# Patient Record
Sex: MALE | Race: WHITE | Employment: FULL TIME | URBAN - METROPOLITAN AREA
[De-identification: names, ages, dates, MRNs, and addresses within clinical notes are randomized per-mention and may not be internally consistent; named-entity substitution may affect disease eponyms.]

---

## 2022-03-20 ENCOUNTER — APPOINTMENT (OUTPATIENT)
Dept: CT IMAGING | Age: 27
DRG: 101 | End: 2022-03-20
Payer: COMMERCIAL

## 2022-03-20 ENCOUNTER — HOSPITAL ENCOUNTER (INPATIENT)
Age: 27
LOS: 2 days | Discharge: HOME | DRG: 101 | End: 2022-03-22
Attending: EMERGENCY MEDICINE | Admitting: INTERNAL MEDICINE
Payer: COMMERCIAL

## 2022-03-20 ENCOUNTER — APPOINTMENT (OUTPATIENT)
Dept: GENERAL RADIOLOGY | Age: 27
DRG: 101 | End: 2022-03-20
Payer: COMMERCIAL

## 2022-03-20 DIAGNOSIS — R79.89 ELEVATED LFTS: ICD-10-CM

## 2022-03-20 DIAGNOSIS — R74.8 ELEVATED LIPASE: ICD-10-CM

## 2022-03-20 DIAGNOSIS — R56.9 SEIZURE (HCC): Primary | ICD-10-CM

## 2022-03-20 PROBLEM — F41.9 ANXIETY: Status: ACTIVE | Noted: 2022-03-20

## 2022-03-20 PROBLEM — E66.3 OVERWEIGHT (BMI 25.0-29.9): Status: ACTIVE | Noted: 2022-03-20

## 2022-03-20 LAB
ABSOLUTE EOS #: 0 K/UL (ref 0–0.4)
ABSOLUTE LYMPH #: 0.9 K/UL (ref 1–4.8)
ABSOLUTE MONO #: 0.7 K/UL (ref 0.1–1.3)
ACETAMINOPHEN LEVEL: <5 UG/ML (ref 10–30)
ALBUMIN SERPL-MCNC: 5.1 G/DL (ref 3.5–5.2)
ALP BLD-CCNC: 73 U/L (ref 40–129)
ALT SERPL-CCNC: 87 U/L (ref 5–41)
ANION GAP SERPL CALCULATED.3IONS-SCNC: 21 MMOL/L (ref 9–17)
AST SERPL-CCNC: 184 U/L
BASOPHILS # BLD: 0 % (ref 0–2)
BASOPHILS ABSOLUTE: 0 K/UL (ref 0–0.2)
BILIRUB SERPL-MCNC: 1.13 MG/DL (ref 0.3–1.2)
BUN BLDV-MCNC: 14 MG/DL (ref 6–20)
CALCIUM SERPL-MCNC: 10.2 MG/DL (ref 8.6–10.4)
CHLORIDE BLD-SCNC: 94 MMOL/L (ref 98–107)
CO2: 19 MMOL/L (ref 20–31)
CREAT SERPL-MCNC: 0.96 MG/DL (ref 0.7–1.2)
EOSINOPHILS RELATIVE PERCENT: 0 % (ref 0–4)
ETHANOL PERCENT: <0.01 %
ETHANOL: <10 MG/DL
GFR AFRICAN AMERICAN: >60 ML/MIN
GFR NON-AFRICAN AMERICAN: >60 ML/MIN
GFR SERPL CREATININE-BSD FRML MDRD: ABNORMAL ML/MIN/{1.73_M2}
GLUCOSE BLD-MCNC: 186 MG/DL (ref 70–99)
HAV IGM SER IA-ACNC: NONREACTIVE
HCT VFR BLD CALC: 47 % (ref 41–53)
HEMOGLOBIN: 16.3 G/DL (ref 13.5–17.5)
HEPATITIS B CORE IGM ANTIBODY: NONREACTIVE
HEPATITIS B SURFACE ANTIGEN: NONREACTIVE
HEPATITIS C ANTIBODY: NONREACTIVE
LIPASE: 173 U/L (ref 13–60)
LYMPHOCYTES # BLD: 9 % (ref 24–44)
MAGNESIUM: 2.3 MG/DL (ref 1.6–2.6)
MCH RBC QN AUTO: 33 PG (ref 26–34)
MCHC RBC AUTO-ENTMCNC: 34.7 G/DL (ref 31–37)
MCV RBC AUTO: 95 FL (ref 80–100)
MONOCYTES # BLD: 7 % (ref 1–7)
PDW BLD-RTO: 14.8 % (ref 11.5–14.9)
PLATELET # BLD: 174 K/UL (ref 150–450)
PMV BLD AUTO: 8.2 FL (ref 6–12)
POTASSIUM SERPL-SCNC: 3.1 MMOL/L (ref 3.7–5.3)
RBC # BLD: 4.95 M/UL (ref 4.5–5.9)
SALICYLATE LEVEL: <1 MG/DL (ref 3–10)
SARS-COV-2, RAPID: NOT DETECTED
SEG NEUTROPHILS: 84 % (ref 36–66)
SEGMENTED NEUTROPHILS ABSOLUTE COUNT: 8.4 K/UL (ref 1.3–9.1)
SODIUM BLD-SCNC: 134 MMOL/L (ref 135–144)
SPECIMEN DESCRIPTION: NORMAL
TOTAL PROTEIN: 7.6 G/DL (ref 6.4–8.3)
TOXIC TRICYCLIC SC,BLOOD: ABNORMAL
TROPONIN, HIGH SENSITIVITY: 11 NG/L (ref 0–22)
TROPONIN, HIGH SENSITIVITY: 9 NG/L (ref 0–22)
TSH SERPL DL<=0.05 MIU/L-ACNC: 2.02 MIU/L (ref 0.3–5)
WBC # BLD: 10.1 K/UL (ref 3.5–11)

## 2022-03-20 PROCEDURE — 93005 ELECTROCARDIOGRAM TRACING: CPT | Performed by: EMERGENCY MEDICINE

## 2022-03-20 PROCEDURE — 99223 1ST HOSP IP/OBS HIGH 75: CPT | Performed by: INTERNAL MEDICINE

## 2022-03-20 PROCEDURE — 80307 DRUG TEST PRSMV CHEM ANLYZR: CPT

## 2022-03-20 PROCEDURE — 36415 COLL VENOUS BLD VENIPUNCTURE: CPT

## 2022-03-20 PROCEDURE — 80179 DRUG ASSAY SALICYLATE: CPT

## 2022-03-20 PROCEDURE — 2580000003 HC RX 258: Performed by: INTERNAL MEDICINE

## 2022-03-20 PROCEDURE — G0480 DRUG TEST DEF 1-7 CLASSES: HCPCS

## 2022-03-20 PROCEDURE — 70450 CT HEAD/BRAIN W/O DYE: CPT

## 2022-03-20 PROCEDURE — 74177 CT ABD & PELVIS W/CONTRAST: CPT

## 2022-03-20 PROCEDURE — 96367 TX/PROPH/DG ADDL SEQ IV INF: CPT

## 2022-03-20 PROCEDURE — 6370000000 HC RX 637 (ALT 250 FOR IP): Performed by: STUDENT IN AN ORGANIZED HEALTH CARE EDUCATION/TRAINING PROGRAM

## 2022-03-20 PROCEDURE — 87635 SARS-COV-2 COVID-19 AMP PRB: CPT

## 2022-03-20 PROCEDURE — 71045 X-RAY EXAM CHEST 1 VIEW: CPT

## 2022-03-20 PROCEDURE — 96361 HYDRATE IV INFUSION ADD-ON: CPT

## 2022-03-20 PROCEDURE — 1200000000 HC SEMI PRIVATE

## 2022-03-20 PROCEDURE — 99285 EMERGENCY DEPT VISIT HI MDM: CPT

## 2022-03-20 PROCEDURE — 80074 ACUTE HEPATITIS PANEL: CPT

## 2022-03-20 PROCEDURE — 85025 COMPLETE CBC W/AUTO DIFF WBC: CPT

## 2022-03-20 PROCEDURE — 96375 TX/PRO/DX INJ NEW DRUG ADDON: CPT

## 2022-03-20 PROCEDURE — 83690 ASSAY OF LIPASE: CPT

## 2022-03-20 PROCEDURE — 96365 THER/PROPH/DIAG IV INF INIT: CPT

## 2022-03-20 PROCEDURE — 80143 DRUG ASSAY ACETAMINOPHEN: CPT

## 2022-03-20 PROCEDURE — 84443 ASSAY THYROID STIM HORMONE: CPT

## 2022-03-20 PROCEDURE — 80053 COMPREHEN METABOLIC PANEL: CPT

## 2022-03-20 PROCEDURE — 83735 ASSAY OF MAGNESIUM: CPT

## 2022-03-20 PROCEDURE — 6360000004 HC RX CONTRAST MEDICATION: Performed by: EMERGENCY MEDICINE

## 2022-03-20 PROCEDURE — 81001 URINALYSIS AUTO W/SCOPE: CPT

## 2022-03-20 PROCEDURE — 6360000002 HC RX W HCPCS: Performed by: EMERGENCY MEDICINE

## 2022-03-20 PROCEDURE — 6370000000 HC RX 637 (ALT 250 FOR IP): Performed by: INTERNAL MEDICINE

## 2022-03-20 PROCEDURE — 84484 ASSAY OF TROPONIN QUANT: CPT

## 2022-03-20 PROCEDURE — 2580000003 HC RX 258: Performed by: EMERGENCY MEDICINE

## 2022-03-20 RX ORDER — 0.9 % SODIUM CHLORIDE 0.9 %
80 INTRAVENOUS SOLUTION INTRAVENOUS ONCE
Status: DISCONTINUED | OUTPATIENT
Start: 2022-03-20 | End: 2022-03-22 | Stop reason: HOSPADM

## 2022-03-20 RX ORDER — POTASSIUM CHLORIDE 20 MEQ/1
40 TABLET, EXTENDED RELEASE ORAL PRN
Status: DISCONTINUED | OUTPATIENT
Start: 2022-03-20 | End: 2022-03-22 | Stop reason: HOSPADM

## 2022-03-20 RX ORDER — SODIUM CHLORIDE 0.9 % (FLUSH) 0.9 %
5-40 SYRINGE (ML) INJECTION EVERY 12 HOURS SCHEDULED
Status: DISCONTINUED | OUTPATIENT
Start: 2022-03-20 | End: 2022-03-22 | Stop reason: HOSPADM

## 2022-03-20 RX ORDER — POTASSIUM CHLORIDE 7.45 MG/ML
10 INJECTION INTRAVENOUS PRN
Status: DISCONTINUED | OUTPATIENT
Start: 2022-03-20 | End: 2022-03-22 | Stop reason: HOSPADM

## 2022-03-20 RX ORDER — SODIUM CHLORIDE 9 MG/ML
25 INJECTION, SOLUTION INTRAVENOUS PRN
Status: DISCONTINUED | OUTPATIENT
Start: 2022-03-20 | End: 2022-03-22 | Stop reason: HOSPADM

## 2022-03-20 RX ORDER — POTASSIUM CHLORIDE 7.45 MG/ML
10 INJECTION INTRAVENOUS ONCE
Status: COMPLETED | OUTPATIENT
Start: 2022-03-20 | End: 2022-03-20

## 2022-03-20 RX ORDER — ONDANSETRON 4 MG/1
4 TABLET, ORALLY DISINTEGRATING ORAL EVERY 8 HOURS PRN
Status: DISCONTINUED | OUTPATIENT
Start: 2022-03-20 | End: 2022-03-22 | Stop reason: HOSPADM

## 2022-03-20 RX ORDER — ACETAMINOPHEN 650 MG/1
650 SUPPOSITORY RECTAL EVERY 6 HOURS PRN
Status: DISCONTINUED | OUTPATIENT
Start: 2022-03-20 | End: 2022-03-22

## 2022-03-20 RX ORDER — POTASSIUM CHLORIDE 20 MEQ/1
40 TABLET, EXTENDED RELEASE ORAL ONCE
Status: COMPLETED | OUTPATIENT
Start: 2022-03-20 | End: 2022-03-20

## 2022-03-20 RX ORDER — POLYETHYLENE GLYCOL 3350 17 G/17G
17 POWDER, FOR SOLUTION ORAL DAILY PRN
Status: DISCONTINUED | OUTPATIENT
Start: 2022-03-20 | End: 2022-03-22 | Stop reason: HOSPADM

## 2022-03-20 RX ORDER — ONDANSETRON 2 MG/ML
4 INJECTION INTRAMUSCULAR; INTRAVENOUS ONCE
Status: COMPLETED | OUTPATIENT
Start: 2022-03-20 | End: 2022-03-20

## 2022-03-20 RX ORDER — ESCITALOPRAM OXALATE 10 MG/1
10 TABLET ORAL DAILY
COMMUNITY

## 2022-03-20 RX ORDER — SODIUM CHLORIDE 0.9 % (FLUSH) 0.9 %
10 SYRINGE (ML) INJECTION PRN
Status: DISCONTINUED | OUTPATIENT
Start: 2022-03-20 | End: 2022-03-22 | Stop reason: HOSPADM

## 2022-03-20 RX ORDER — 0.9 % SODIUM CHLORIDE 0.9 %
1000 INTRAVENOUS SOLUTION INTRAVENOUS ONCE
Status: COMPLETED | OUTPATIENT
Start: 2022-03-20 | End: 2022-03-20

## 2022-03-20 RX ORDER — ONDANSETRON 2 MG/ML
4 INJECTION INTRAMUSCULAR; INTRAVENOUS EVERY 6 HOURS PRN
Status: DISCONTINUED | OUTPATIENT
Start: 2022-03-20 | End: 2022-03-22 | Stop reason: HOSPADM

## 2022-03-20 RX ORDER — SODIUM CHLORIDE 9 MG/ML
INJECTION, SOLUTION INTRAVENOUS CONTINUOUS
Status: DISCONTINUED | OUTPATIENT
Start: 2022-03-20 | End: 2022-03-22 | Stop reason: HOSPADM

## 2022-03-20 RX ORDER — SODIUM CHLORIDE 0.9 % (FLUSH) 0.9 %
5-40 SYRINGE (ML) INJECTION PRN
Status: DISCONTINUED | OUTPATIENT
Start: 2022-03-20 | End: 2022-03-22 | Stop reason: HOSPADM

## 2022-03-20 RX ORDER — LEVETIRACETAM 500 MG/1
500 TABLET ORAL 2 TIMES DAILY
Status: DISCONTINUED | OUTPATIENT
Start: 2022-03-20 | End: 2022-03-22 | Stop reason: HOSPADM

## 2022-03-20 RX ORDER — ACETAMINOPHEN 325 MG/1
650 TABLET ORAL EVERY 6 HOURS PRN
Status: DISCONTINUED | OUTPATIENT
Start: 2022-03-20 | End: 2022-03-22

## 2022-03-20 RX ADMIN — LEVETIRACETAM 500 MG: 500 TABLET, FILM COATED ORAL at 21:10

## 2022-03-20 RX ADMIN — LEVETIRACETAM 1000 MG: 100 INJECTION INTRAVENOUS at 15:07

## 2022-03-20 RX ADMIN — POTASSIUM CHLORIDE 10 MEQ: 7.46 INJECTION, SOLUTION INTRAVENOUS at 13:59

## 2022-03-20 RX ADMIN — ONDANSETRON 4 MG: 2 INJECTION INTRAMUSCULAR; INTRAVENOUS at 12:52

## 2022-03-20 RX ADMIN — POTASSIUM CHLORIDE 40 MEQ: 20 TABLET, EXTENDED RELEASE ORAL at 21:10

## 2022-03-20 RX ADMIN — SODIUM CHLORIDE 1000 ML: 9 INJECTION, SOLUTION INTRAVENOUS at 12:51

## 2022-03-20 RX ADMIN — SODIUM CHLORIDE, PRESERVATIVE FREE 10 ML: 5 INJECTION INTRAVENOUS at 13:35

## 2022-03-20 RX ADMIN — SODIUM CHLORIDE: 9 INJECTION, SOLUTION INTRAVENOUS at 22:20

## 2022-03-20 RX ADMIN — IOPAMIDOL 75 ML: 755 INJECTION, SOLUTION INTRAVENOUS at 13:35

## 2022-03-20 RX ADMIN — SODIUM CHLORIDE, PRESERVATIVE FREE 10 ML: 5 INJECTION INTRAVENOUS at 21:12

## 2022-03-20 ASSESSMENT — ENCOUNTER SYMPTOMS
SORE THROAT: 0
COLOR CHANGE: 0
BACK PAIN: 0
TROUBLE SWALLOWING: 0
BLOOD IN STOOL: 0
ABDOMINAL DISTENTION: 1
CHEST TIGHTNESS: 0
ABDOMINAL PAIN: 0
COUGH: 0
SHORTNESS OF BREATH: 0
DIARRHEA: 0
VOMITING: 0
CONSTIPATION: 0
NAUSEA: 0
BACK PAIN: 1

## 2022-03-20 ASSESSMENT — PAIN SCALES - GENERAL
PAINLEVEL_OUTOF10: 0
PAINLEVEL_OUTOF10: 0

## 2022-03-20 NOTE — FLOWSHEET NOTE
Patient admitted to room 2044 per wheelchair from the ED.  VS, assessment and orientation to the room and call system completed. Orders reviewed with the patient. Telemetry applied and siderails padded.

## 2022-03-20 NOTE — ED NOTES
Report given to CIRO Harry from [facility]. Report method by phone   The following was reviewed with receiving RN:   Current vital signs:  BP (!) 127/93   Pulse 82   Temp 98.9 °F (37.2 °C)   Resp 20   Ht 5' 7\" (1.702 m)   Wt 175 lb (79.4 kg)   SpO2 97%   BMI 27.41 kg/m²                MEWS Score: 2     Any medication or safety alerts were reviewed. Any pending diagnostics and notifications were also reviewed, as well as any safety concerns or issues, abnormal labs, abnormal imaging, and abnormal assessment findings. Questions were answered.           Justin Thompson RN  03/20/22 5871

## 2022-03-20 NOTE — H&P
[facility]     HISTORY AND PHYSICAL EXAMINATION            Date:   3/20/2022  Patient name:  Marichuy Bush  Date of admission:  3/20/2022 12:27 PM  MRN:   [id number]  Account:  [de-identified]  YOB: 1995  PCP:    No primary care provider on file. Room:   02/02  Code Status:    No Order    Chief Complaint:     Chief Complaint   Patient presents with    Seizures       History Obtained From:     patient, electronic medical record    History of Present Illness: The patient is a 31 y.o. male history of anxiety who presents following a prior generalized tonic-clonic seizure. Patient is a  for [employer], was in [employer] of the ship when he experienced an apparent full body tonic-clonic seizure lasting for approximately a minute. Denies any tongue biting or loss of urine/stool. Does not remember anything during the seizure, but does report a rapid recovery shortly after. Reports abdominal pain that was relieved after vomiting. 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 Also endorses long-term alcohol and tobacco use. Last drink was approximately 3 to 4 days ago. Denies illicit substance use. In the ED, patient was hypertensive /93, tachycardic , afebrile. Initial laboratory work-up significant for sodium 134, potassium 3.1, ALT/AST 87/184, lipase 173, WBC 10.1, TSH 2.02. Additional work-up included:     CT head no acute intracranial abnormality   CT abdomen pelvis showing fatty liver without evidence for pancreatitis   CXR unremarkable   EKG normal sinus rhythm,     Patient was given 1 L fluid bolus as well as 10 mEq KCl and 4 mg Zofran x1.   Neurology was consulted, recommended loading with 1 g Keppra and 500 twice daily to follow. Urine drug screen and hepatitis panel collected. Decision was made to admit patient for further management of new onset seizure and neurological evaluation. Past Medical History:     History reviewed. No pertinent past medical history. Past SurgicalHistory:     History reviewed. No pertinent surgical history. Medications Prior to Admission:     Prior to Admission medications    Not on File        Allergies:     Patient has no known allergies. Social History:     Tobacco:    has no history on file for tobacco use. Alcohol:      has no history on file for alcohol use. Drug Use:  has no history on file for drug use. Family History:     History reviewed. No pertinent family history. Review of Systems:     Positive and Negative as described in HPI. Review of Systems   Constitutional: Positive for fatigue. Negative for chills and fever. Respiratory: Negative for cough, chest tightness and shortness of breath. Cardiovascular: Negative for chest pain and palpitations. Gastrointestinal: Positive for abdominal distention. Negative for abdominal pain. Genitourinary: Negative for difficulty urinating and dysuria. Musculoskeletal: Positive for back pain and myalgias. Neurological: Positive for seizures. Negative for speech difficulty, light-headedness and headaches. Psychiatric/Behavioral: Negative for agitation and behavioral problems. The patient is nervous/anxious. Physical Exam:   BP (!) 129/95   Pulse 86   Temp 98.9 °F (37.2 °C)   Resp 20   Ht 5' 7\" (1.702 m)   Wt 175 lb (79.4 kg)   SpO2 96%   BMI 27.41 kg/m²   Temp (24hrs), Av.9 °F (37.2 °C), Min:98.9 °F (37.2 °C), Max:98.9 °F (37.2 °C)    No results for input(s): POCGLU in the last 72 hours.     Intake/Output Summary (Last 24 hours) at 3/20/2022 1628  Last data filed at 3/20/2022 1528  Gross per 24 hour   Intake 1199.94 ml   Output --   Net 1199.94 ml       Physical Exam  Constitutional: Appearance: Normal appearance. HENT:      Head: Normocephalic and atraumatic. Eyes:      General: No visual field deficit. Extraocular Movements: Extraocular movements intact. Conjunctiva/sclera: Conjunctivae normal.   Cardiovascular:      Rate and Rhythm: Normal rate and regular rhythm. Heart sounds: Normal heart sounds. Pulmonary:      Effort: Pulmonary effort is normal. No respiratory distress. Breath sounds: Normal breath sounds. No wheezing. Abdominal:      General: Abdomen is flat. There is no distension. Palpations: Abdomen is soft. Tenderness: There is no abdominal tenderness. Musculoskeletal:         General: Tenderness present. Normal range of motion. Right lower leg: No edema. Left lower leg: No edema. Comments: Myalgia pain throughout bilateral lower extremities   Neurological:      General: No focal deficit present. Mental Status: He is alert and oriented to person, place, and time. Mental status is at baseline. Cranial Nerves: No facial asymmetry. Sensory: No sensory deficit. Motor: No weakness.    Psychiatric:         Mood and Affect: Mood normal.         Behavior: Behavior normal.      Comments: Visibly anxious, shaky       Investigations:     Laboratory Testing:  Recent Results (from the past 24 hour(s))   EKG 12 Lead    Collection Time: 03/20/22 12:40 PM   Result Value Ref Range    Ventricular Rate 92 BPM    Atrial Rate 92 BPM    P-R Interval 114 ms    QRS Duration 92 ms    Q-T Interval 364 ms    QTc Calculation (Bazett) 450 ms    P Axis 46 degrees    R Axis 64 degrees    T Axis 42 degrees   CBC with Auto Differential    Collection Time: 03/20/22 12:44 PM   Result Value Ref Range    WBC 10.1 3.5 - 11.0 k/uL    RBC 4.95 4.5 - 5.9 m/uL    Hemoglobin 16.3 13.5 - 17.5 g/dL    Hematocrit 47.0 41 - 53 %    MCV 95.0 80 - 100 fL    MCH 33.0 26 - 34 pg    MCHC 34.7 31 - 37 g/dL    RDW 14.8 11.5 - 14.9 %    Platelets 695 063 - 757 k/uL MPV 8.2 6.0 - 12.0 fL    Seg Neutrophils 84 (H) 36 - 66 %    Lymphocytes 9 (L) 24 - 44 %    Monocytes 7 1 - 7 %    Eosinophils % 0 0 - 4 %    Basophils 0 0 - 2 %    Segs Absolute 8.40 1.3 - 9.1 k/uL    Absolute Lymph # 0.90 (L) 1.0 - 4.8 k/uL    Absolute Mono # 0.70 0.1 - 1.3 k/uL    Absolute Eos # 0.00 0.0 - 0.4 k/uL    Basophils Absolute 0.00 0.0 - 0.2 k/uL   Comprehensive Metabolic Panel    Collection Time: 03/20/22 12:44 PM   Result Value Ref Range    Glucose 186 (H) 70 - 99 mg/dL    BUN 14 6 - 20 mg/dL    CREATININE 0.96 0.70 - 1.20 mg/dL    Calcium 10.2 8.6 - 10.4 mg/dL    Sodium 134 (L) 135 - 144 mmol/L    Potassium 3.1 (L) 3.7 - 5.3 mmol/L    Chloride 94 (L) 98 - 107 mmol/L    CO2 19 (L) 20 - 31 mmol/L    Anion Gap 21 (H) 9 - 17 mmol/L    Alkaline Phosphatase 73 40 - 129 U/L    ALT 87 (H) 5 - 41 U/L     (H) <40 U/L    Total Bilirubin 1.13 0.3 - 1.2 mg/dL    Total Protein 7.6 6.4 - 8.3 g/dL    Albumin 5.1 3.5 - 5.2 g/dL    GFR Non-African American >60 >60 mL/min    GFR African American >60 >60 mL/min    GFR Comment         Lipase    Collection Time: 03/20/22 12:44 PM   Result Value Ref Range    Lipase 173 (H) 13 - 60 U/L   TOX SCR, BLD, ED    Collection Time: 03/20/22 12:44 PM   Result Value Ref Range    Acetaminophen Level <5 (L) 10 - 30 ug/mL    Ethanol <10 <10 mg/dL    Ethanol percent <2.835 %    Salicylate Lvl <1 (L) 3 - 10 mg/dL    Toxic Tricyclic Sc,Blood ADD TO URINE NEGATIVE   TSH w/reflex to FT4    Collection Time: 03/20/22 12:44 PM   Result Value Ref Range    TSH 2.02 0.30 - 5.00 mIU/L   Magnesium    Collection Time: 03/20/22 12:44 PM   Result Value Ref Range    Magnesium 2.3 1.6 - 2.6 mg/dL   Troponin    Collection Time: 03/20/22 12:44 PM   Result Value Ref Range    Troponin, High Sensitivity 9 0 - 22 ng/L   COVID-19, Rapid    Collection Time: 03/20/22 12:48 PM    Specimen: Nasopharyngeal Swab   Result Value Ref Range    Specimen Description . NASOPHARYNGEAL SWAB     SARS-CoV-2, Rapid Not Detected Not Detected       Imaging/Diagnostics:  CT HEAD WO CONTRAST    Result Date: 3/20/2022  No acute intracranial abnormality evident by CT. CT ABDOMEN PELVIS W IV CONTRAST Additional Contrast? None    Result Date: 3/20/2022  1. Mild to moderate colonic diverticulosis. Normal appendix. Probable sequela of remote colitis or inflammatory bowel disease. 2. Fatty liver. 3. Small midline fat-containing periumbilical hernia. 4. No CT evidence for acute pancreatitis. 5. Grade 1 spondylolisthesis measuring 1 mm of L5 on S1.     XR CHEST PORTABLE    Result Date: 3/20/2022  No acute cardiopulmonary process. Assessment :      Primary Problem   Generalized seizure [facility])    Active Hospital Problems    Diagnosis Date Noted    Generalized seizure ([facility] [address] [R56.9] 03/20/2022    Anxiety [F41.9] 03/20/2022    Overweight (BMI 25.0-29. 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 of 15, numerous concussions  - UA, U tox pending, seizure precautions  - Neurology consulted   Loaded with 1 g Keppra, 500 twice daily afterward   MRI brain without contrast, EEG planned for 3/21    Hypokalemia  - Initial potassium 3.1, received 10 mEq in ED, replacement scale in place    DVT prophylaxis: Lovenox 40 mg daily  GI prophylaxis: None indicated  Code: Full  Dispo: TBD    Consultations:   IP CONSULT TO NEUROLOGY  IP CONSULT TO INTERNAL MEDICINE    Patient is admitted as inpatient status because of co-morbidities listed above, severity of signs and symptoms as outlined, requirement for current medical therapies and most importantly because of direct risk to patient if care not provided in a hospital setting. Marychuy London MD  3/20/2022  4:28 PM    Copy sent to Dr. Jimenez primary care provider on file.       Attestation and add on       I have discussed the care of Ritchie Billy , including pertinent history and exam findings,      3/20/22    with the resident. I have seen and examined the patient and the key elements of all parts of the encounter have been performed by me . I agree with the assessment, plan and orders as documented by the resident. Principal Problem:    Generalized seizure ([address]  Active Problems:    Anxiety    Overweight (BMI 25.0-29. 9)    Seizure ([address]  Resolved Problems:    * No resolved hospital problems. *         ---- ;     MD YENIFER [address]    Phone [phone]   Fax: [phone]  Answering Service: [phone]

## 2022-03-20 NOTE — ED PROVIDER NOTES
[address]  EMERGENCY DEPARTMENT ENCOUNTER    Pt Name: Mirella Cr  MRN: [id number]  YOB: 1995  Date of evaluation:3/20/22  PCP: No primary care provider on file. CHIEF COMPLAINT       Chief Complaint   Patient presents with    Seizures       HISTORY OF PRESENT ILLNESS    Mirella Cr is a 31 y.o. male who presents with a chief complaint of seizure-like activity. Patient had a witnessed episode of full body tonic-clonic seizure activity that lasted about 1 minute. No loss of bowel or bladder control. Did not have to receive any medications per EMS. He states he does not really remember what happened and does not know what he was doing prior to the seizure happening. He apparently was found on the ground earlier this morning and they were concerned he may have had another seizure earlier this morning. No history of seizures. Denies any drug or alcohol use last night or today. He is ports feeling anxious but otherwise has no complaints. Symptoms are acute. Symptomatic per nothing else make symptoms better or worse. Patient has no other complaints at this time. REVIEW OF SYSTEMS       Review of Systems   Constitutional: Negative for chills, fatigue and fever. HENT: Negative for congestion, ear pain, sore throat and trouble swallowing. Eyes: Negative for visual disturbance. Respiratory: Negative for cough and shortness of breath. Cardiovascular: Negative for chest pain, palpitations and leg swelling. Gastrointestinal: Negative for abdominal pain, blood in stool, constipation, diarrhea, nausea and vomiting. Genitourinary: Negative for dysuria and flank pain. Musculoskeletal: Negative for arthralgias, back pain, myalgias and neck pain. Skin: Negative for color change, rash and wound. Neurological: Positive for seizures. Negative for dizziness, weakness, light-headedness, numbness and headaches. Psychiatric/Behavioral: Negative for confusion.  The patient is nervous/anxious. All other systems reviewed and are negative. Negative in 10 essential Systems except as mentioned above and in the HPI. PAST MEDICAL HISTORY   History reviewed. No pertinent past medical history. None    SURGICAL HISTORY      has no past surgical history on file. None    CURRENT MEDICATIONS       Previous Medications    No medications on file       ALLERGIES     has No Known Allergies. FAMILY HISTORY     has no family status information on file. No history of seizures  family history is not on file. SOCIAL HISTORY     Denies drug or alcohol use    PHYSICAL EXAM     INITIAL VITALS:  height is 5' 7\" (1.702 m) and weight is 175 lb (79.4 kg). His temperature is 98.9 °F (37.2 °C). His blood pressure is 136/99 (abnormal) and his pulse is 85. His respiration is 20 and oxygen saturation is 98%. Physical Exam  Vitals and nursing note reviewed. Constitutional:       General: He is not in acute distress. HENT:      Head: Normocephalic and atraumatic. Eyes:      Conjunctiva/sclera: Conjunctivae normal.      Pupils: Pupils are equal, round, and reactive to light. Cardiovascular:      Rate and Rhythm: Regular rhythm. Tachycardia present. Heart sounds: Normal heart sounds. No murmur heard. Pulmonary:      Effort: Pulmonary effort is normal. No respiratory distress. Breath sounds: Normal breath sounds. Abdominal:      General: Bowel sounds are normal. There is no distension. Palpations: Abdomen is soft. Tenderness: There is no abdominal tenderness. Musculoskeletal:         General: No tenderness. Cervical back: Neck supple. Lymphadenopathy:      Cervical: No cervical adenopathy. Skin:     General: Skin is warm and dry. Findings: No rash. Neurological:      General: No focal deficit present. Mental Status: He is alert and oriented to person, place, and time.    Psychiatric:         Judgment: Judgment normal.           DIFFERENTIAL DIAGNOSIS/MDM:   69-year-old male presents after witnessed seizure-like activity. He is afebrile, nontoxic, mildly tachycardic otherwise vital signs normal.  No acute distress. He is alert and orient x4 with a GCS of 15. No gross neurologic deficits on exam.    We will get lab work, CT head. From what they are describing it does sound like a true generalized seizure. DIAGNOSTIC RESULTS     EKG: All EKG's are interpreted by the Emergency Department Physician who either signs or Co-signs this chart in the absence of a cardiologist.    EKG Interpretation    Interpreted by me    Rhythm: normal sinus   Rate: normal  Axis: normal  Ectopy: none  Conduction: normal  ST Segments: no acute change  T Waves: Nonspecific, inversion lead III  Q Waves: none    Clinical Impression: Nonspecific EKG    RADIOLOGY:   I directly visualized the following  images and reviewed the radiologist interpretations:  CT ABDOMEN PELVIS W IV CONTRAST Additional Contrast? None   Preliminary Result   1. Mild to moderate colonic diverticulosis. Normal appendix. Probable   sequela of remote colitis or inflammatory bowel disease. 2. Fatty liver. 3. Small midline fat-containing periumbilical hernia. 4. No CT evidence for acute pancreatitis. 5. Grade 1 spondylolisthesis measuring 1 mm of L5 on S1.         CT HEAD WO CONTRAST   Preliminary Result   No acute intracranial abnormality evident by CT. XR CHEST PORTABLE   Final Result   No acute cardiopulmonary process.                  ED BEDSIDE ULTRASOUND:      LABS:  Labs Reviewed   CBC WITH AUTO DIFFERENTIAL - Abnormal; Notable for the following components:       Result Value    Seg Neutrophils 84 (*)     Lymphocytes 9 (*)     Absolute Lymph # 0.90 (*)     All other components within normal limits   COMPREHENSIVE METABOLIC PANEL - Abnormal; Notable for the following components:    Glucose 186 (*)     Sodium 134 (*)     Potassium 3.1 (*)     Chloride 94 (*)     CO2 19 (*)     Anion recognition program.  Efforts were made to edit the dictations but occasionally words are mis-transcribed.)    Kerline Plata DO  Attending Emergency Physician          Kerline Plata DO  03/20/22 8023

## 2022-03-20 NOTE — ED TRIAGE NOTES
Mode of arrival (squad #, walk in, police, etc) : [facility]      Chief complaint(s): Seizures        Arrival Note (brief scenario, treatment PTA, etc). : Patient arrived to ED this afternoon via squad after having witnessed seizure. Bystanders witnessed full body seizure lasting approximately 1 minute. Patient's father also stated that patient may have had one this morning after being found on floor in front of chair at home. Patient has no known history of seizures only reports history of anxiety. Patient doesn't remember what he was doing at the time only that he remembers being at lunch break. Patient denies lost of bowels or urine but states he did bite his tongue. Patient alert and oriented upon arrival to ed and answering all questions appropriately,y          C= \"Have you ever felt that you should Cut down on your drinking? \"  No  A= \"Have people Annoyed you by criticizing your drinking? \"  No  G= \"Have you ever felt bad or Guilty about your drinking? \"  No  E= \"Have you ever had a drink as an Eye-opener first thing in the morning to steady your nerves or to help a hangover? \"  No      Deferred []      Reason for deferring: N/A    *If yes to two or more: probable alcohol abuse. *

## 2022-03-21 ENCOUNTER — APPOINTMENT (OUTPATIENT)
Dept: MRI IMAGING | Age: 27
DRG: 101 | End: 2022-03-21
Payer: COMMERCIAL

## 2022-03-21 LAB
-: ABNORMAL
ABSOLUTE EOS #: 0.1 K/UL (ref 0–0.4)
ABSOLUTE LYMPH #: 1.7 K/UL (ref 1–4.8)
ABSOLUTE MONO #: 0.6 K/UL (ref 0.1–1.3)
AMPHETAMINE SCREEN URINE: NEGATIVE
ANION GAP SERPL CALCULATED.3IONS-SCNC: 12 MMOL/L (ref 9–17)
BACTERIA: ABNORMAL
BARBITURATE SCREEN URINE: NEGATIVE
BASOPHILS # BLD: 0 % (ref 0–2)
BASOPHILS ABSOLUTE: 0 K/UL (ref 0–0.2)
BENZODIAZEPINE SCREEN, URINE: NEGATIVE
BILIRUBIN URINE: NEGATIVE
BUN BLDV-MCNC: 9 MG/DL (ref 6–20)
CALCIUM SERPL-MCNC: 9.3 MG/DL (ref 8.6–10.4)
CANNABINOID SCREEN URINE: NEGATIVE
CHLORIDE BLD-SCNC: 106 MMOL/L (ref 98–107)
CO2: 21 MMOL/L (ref 20–31)
COCAINE METABOLITE, URINE: NEGATIVE
COLOR: ABNORMAL
CREAT SERPL-MCNC: 0.89 MG/DL (ref 0.7–1.2)
EOSINOPHILS RELATIVE PERCENT: 1 % (ref 0–4)
EPITHELIAL CELLS UA: ABNORMAL /HPF
GFR AFRICAN AMERICAN: >60 ML/MIN
GFR NON-AFRICAN AMERICAN: >60 ML/MIN
GFR SERPL CREATININE-BSD FRML MDRD: NORMAL ML/MIN/{1.73_M2}
GLUCOSE BLD-MCNC: 94 MG/DL (ref 70–99)
GLUCOSE URINE: NEGATIVE
HCT VFR BLD CALC: 46.1 % (ref 41–53)
HEMOGLOBIN: 15.5 G/DL (ref 13.5–17.5)
KETONES, URINE: ABNORMAL
LEUKOCYTE ESTERASE, URINE: NEGATIVE
LYMPHOCYTES # BLD: 23 % (ref 24–44)
MCH RBC QN AUTO: 32.7 PG (ref 26–34)
MCHC RBC AUTO-ENTMCNC: 33.5 G/DL (ref 31–37)
MCV RBC AUTO: 97.6 FL (ref 80–100)
METHADONE SCREEN, URINE: NEGATIVE
MONOCYTES # BLD: 8 % (ref 1–7)
NITRITE, URINE: NEGATIVE
OPIATES, URINE: NEGATIVE
OXYCODONE SCREEN URINE: NEGATIVE
PDW BLD-RTO: 14.9 % (ref 11.5–14.9)
PH UA: 7.5 (ref 5–8)
PHENCYCLIDINE, URINE: NEGATIVE
PLATELET # BLD: 156 K/UL (ref 150–450)
PMV BLD AUTO: 8.3 FL (ref 6–12)
POTASSIUM SERPL-SCNC: 3.9 MMOL/L (ref 3.7–5.3)
PROTEIN UA: ABNORMAL
RBC # BLD: 4.72 M/UL (ref 4.5–5.9)
RBC UA: ABNORMAL /HPF
SEG NEUTROPHILS: 68 % (ref 36–66)
SEGMENTED NEUTROPHILS ABSOLUTE COUNT: 5 K/UL (ref 1.3–9.1)
SODIUM BLD-SCNC: 139 MMOL/L (ref 135–144)
SPECIFIC GRAVITY UA: 1.09 (ref 1–1.03)
TEST INFORMATION: NORMAL
TRICYCLIC ANTIDEP,URINE: NEGATIVE
TURBIDITY: CLEAR
URINE HGB: ABNORMAL
UROBILINOGEN, URINE: NORMAL
WBC # BLD: 7.3 K/UL (ref 3.5–11)
WBC UA: ABNORMAL /HPF

## 2022-03-21 PROCEDURE — 36415 COLL VENOUS BLD VENIPUNCTURE: CPT

## 2022-03-21 PROCEDURE — 95816 EEG AWAKE AND DROWSY: CPT

## 2022-03-21 PROCEDURE — 70551 MRI BRAIN STEM W/O DYE: CPT

## 2022-03-21 PROCEDURE — 85025 COMPLETE CBC W/AUTO DIFF WBC: CPT

## 2022-03-21 PROCEDURE — 99233 SBSQ HOSP IP/OBS HIGH 50: CPT | Performed by: INTERNAL MEDICINE

## 2022-03-21 PROCEDURE — 6370000000 HC RX 637 (ALT 250 FOR IP)

## 2022-03-21 PROCEDURE — 80048 BASIC METABOLIC PNL TOTAL CA: CPT

## 2022-03-21 PROCEDURE — 6360000002 HC RX W HCPCS

## 2022-03-21 PROCEDURE — 6370000000 HC RX 637 (ALT 250 FOR IP): Performed by: INTERNAL MEDICINE

## 2022-03-21 PROCEDURE — 99222 1ST HOSP IP/OBS MODERATE 55: CPT | Performed by: PSYCHIATRY & NEUROLOGY

## 2022-03-21 PROCEDURE — 1200000000 HC SEMI PRIVATE

## 2022-03-21 RX ORDER — CHLORDIAZEPOXIDE HYDROCHLORIDE 5 MG/1
10 CAPSULE, GELATIN COATED ORAL 3 TIMES DAILY
Status: DISCONTINUED | OUTPATIENT
Start: 2022-03-21 | End: 2022-03-22

## 2022-03-21 RX ORDER — ESCITALOPRAM OXALATE 10 MG/1
10 TABLET ORAL DAILY
Status: DISCONTINUED | OUTPATIENT
Start: 2022-03-22 | End: 2022-03-22 | Stop reason: HOSPADM

## 2022-03-21 RX ORDER — FOLIC ACID 1 MG/1
1 TABLET ORAL DAILY
Status: DISCONTINUED | OUTPATIENT
Start: 2022-03-21 | End: 2022-03-22 | Stop reason: HOSPADM

## 2022-03-21 RX ORDER — THIAMINE HYDROCHLORIDE 100 MG/ML
100 INJECTION, SOLUTION INTRAMUSCULAR; INTRAVENOUS DAILY
Status: DISCONTINUED | OUTPATIENT
Start: 2022-03-21 | End: 2022-03-22 | Stop reason: HOSPADM

## 2022-03-21 RX ADMIN — CHLORDIAZEPOXIDE HYDROCHLORIDE 10 MG: 5 CAPSULE ORAL at 09:28

## 2022-03-21 RX ADMIN — FOLIC ACID 1 MG: 1 TABLET ORAL at 09:28

## 2022-03-21 RX ADMIN — CHLORDIAZEPOXIDE HYDROCHLORIDE 10 MG: 5 CAPSULE ORAL at 20:49

## 2022-03-21 RX ADMIN — LEVETIRACETAM 500 MG: 500 TABLET, FILM COATED ORAL at 08:08

## 2022-03-21 RX ADMIN — LEVETIRACETAM 500 MG: 500 TABLET, FILM COATED ORAL at 20:49

## 2022-03-21 RX ADMIN — THIAMINE HYDROCHLORIDE 100 MG: 100 INJECTION, SOLUTION INTRAMUSCULAR; INTRAVENOUS at 09:27

## 2022-03-21 RX ADMIN — CHLORDIAZEPOXIDE HYDROCHLORIDE 10 MG: 5 CAPSULE ORAL at 14:04

## 2022-03-21 ASSESSMENT — PAIN SCALES - GENERAL
PAINLEVEL_OUTOF10: 0

## 2022-03-21 ASSESSMENT — ENCOUNTER SYMPTOMS
COUGH: 0
SHORTNESS OF BREATH: 0
DIARRHEA: 1
VOMITING: 0
EYE DISCHARGE: 0
EYE REDNESS: 0

## 2022-03-21 NOTE — PROGRESS NOTES
MRI checklist completed last night by patient and his mother. Writer attempted to fax down to MRI dept several times, with no success. Writer called MRI dept this am and no answer. Will notify day shift RN.

## 2022-03-21 NOTE — PROGRESS NOTES
Pt has MRI brain ordered. Please fill out a screening form with the patient at your earliest convenience and fax to MRI @ [phone]. Thank you.
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
## 2022-03-21 NOTE — PLAN OF CARE
Attestation and add on       I have discussed the care of Kylah Zambrano , including pertinent history and exam findings,      3/21/22    with the resident. I have seen and examined the patient and the key elements of all parts of the encounter have been performed by me . I agree with the assessment, plan and orders as documented by the resident. Principal Problem:    Generalized seizure ([address]  Active Problems:    Anxiety    Overweight (BMI 25.0-29. 9)    Seizure ([address]  Resolved Problems:    * No resolved hospital problems. *        ''''''''''       MD YENIFER Aldana [address], [address] [place] [address].    Phone [phone]   Fax: [phone]  Answering Service: [phone] Name band;
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
## 2022-03-21 NOTE — PLAN OF CARE
Problem: Infection:  Goal: Will remain free from infection  Description: Will remain free from infection  3/21/2022 1559 by Cem Hunter RN  Outcome: Ongoing  3/21/2022 0215 by Felice Guerrier RN  Outcome: Ongoing  Note: Vitals stable patient afebrile, will continue to monitor patient and labs     Problem: Safety:  Goal: Free from accidental physical injury  Description: Free from accidental physical injury  3/21/2022 1559 by Cem Hunter RN  Outcome: Ongoing  3/21/2022 0215 by Felice Guerrier RN  Outcome: Ongoing  Note: Fall precautions in place, patient free from injuries.  Patient educated about safety precautions, will continue to monitor    Goal: Free from intentional harm  Description: Free from intentional harm  3/21/2022 1559 by Cem Hunter RN  Outcome: Ongoing  3/21/2022 0215 by Felice Guerrier RN  Outcome: Ongoing  Goal: Ability to remain free from injury will improve  Description: Ability to remain free from injury will improve  3/21/2022 1559 by Cem Hunter RN  Outcome: Ongoing  3/21/2022 0215 by Felice Guerrier RN  Outcome: Ongoing     Problem: Daily Care:  Goal: Daily care needs are met  Description: Daily care needs are met  3/21/2022 1559 by Cem Hunter RN  Outcome: Ongoing  3/21/2022 0215 by Felice Guerrier RN  Outcome: Ongoing     Problem: Pain:  Goal: Patient's pain/discomfort is manageable  Description: Patient's pain/discomfort is manageable  3/21/2022 1559 by Cem Hunter RN  Outcome: Ongoing  3/21/2022 0215 by Felice Guerrier RN  Outcome: Ongoing  Note: Patient pain free will continue to monitor      Problem: Skin Integrity:  Goal: Skin integrity will stabilize  Description: Skin integrity will stabilize  3/21/2022 1559 by Cem Hunter RN  Outcome: Ongoing  3/21/2022 0215 by Felice Guerrier RN  Outcome: Ongoing  Note: Skin tear to tongue scabbed, no s/s of infections      Problem: Discharge Planning:  Goal: Patients continuum of care needs are met  Description: Patients continuum of care needs are met  Outcome: Ongoing     Problem: Anxiety:  Goal: Level of anxiety will decrease  Description: Level of anxiety will decrease  3/21/2022 1559 by Keri Prince RN  Outcome: Ongoing  3/21/2022 0215 by Bucky Chou RN  Outcome: Ongoing     Problem: Coping:  Goal: Ability to cope will improve  Description: Ability to cope will improve  3/21/2022 1559 by Keri Prince RN  Outcome: Ongoing  3/21/2022 0215 by Bucky Chou RN  Outcome: Ongoing  Goal: Ability to identify appropriate support needs will improve  Description: Ability to identify appropriate support needs will improve  Outcome: Ongoing     Problem: Health Behavior:  Goal: Ability to manage health-related needs will improve  Description: Ability to manage health-related needs will improve  Outcome: Ongoing     Problem: Physical Regulation:  Goal: Signs of adequate cerebral perfusion will increase  Description: Signs of adequate cerebral perfusion will increase  Outcome: Ongoing  Goal: Ability to maintain a stable neurologic state will improve  Description: Ability to maintain a stable neurologic state will improve  Outcome: Ongoing     Problem: Self-Concept:  Goal: Level of anxiety will decrease  Description: Level of anxiety will decrease  3/21/2022 1559 by Keri Prince RN  Outcome: Ongoing  3/21/2022 0215 by Bucky Chou RN  Outcome: Ongoing  Goal: Ability to verbalize feelings about condition will improve  Description: Ability to verbalize feelings about condition will improve  3/21/2022 1559 by Keri Prince RN  Outcome: Ongoing  3/21/2022 0215 by Bucky Chou RN  Outcome: Ongoing

## 2022-03-22 VITALS
DIASTOLIC BLOOD PRESSURE: 105 MMHG | SYSTOLIC BLOOD PRESSURE: 153 MMHG | BODY MASS INDEX: 27.47 KG/M2 | RESPIRATION RATE: 18 BRPM | WEIGHT: 175 LBS | TEMPERATURE: 98.6 F | HEART RATE: 97 BPM | OXYGEN SATURATION: 97 % | HEIGHT: 67 IN

## 2022-03-22 LAB
ANION GAP SERPL CALCULATED.3IONS-SCNC: 13 MMOL/L (ref 9–17)
CHLORIDE BLD-SCNC: 105 MMOL/L (ref 98–107)
CO2: 23 MMOL/L (ref 20–31)
EKG ATRIAL RATE: 95 BPM
EKG P AXIS: 44 DEGREES
EKG P-R INTERVAL: 120 MS
EKG Q-T INTERVAL: 356 MS
EKG QRS DURATION: 90 MS
EKG QTC CALCULATION (BAZETT): 447 MS
EKG R AXIS: 67 DEGREES
EKG T AXIS: 36 DEGREES
EKG VENTRICULAR RATE: 95 BPM
POTASSIUM SERPL-SCNC: 3.7 MMOL/L (ref 3.7–5.3)
SODIUM BLD-SCNC: 141 MMOL/L (ref 135–144)

## 2022-03-22 PROCEDURE — 6370000000 HC RX 637 (ALT 250 FOR IP): Performed by: INTERNAL MEDICINE

## 2022-03-22 PROCEDURE — 99239 HOSP IP/OBS DSCHRG MGMT >30: CPT | Performed by: INTERNAL MEDICINE

## 2022-03-22 PROCEDURE — 36415 COLL VENOUS BLD VENIPUNCTURE: CPT

## 2022-03-22 PROCEDURE — 97161 PT EVAL LOW COMPLEX 20 MIN: CPT

## 2022-03-22 PROCEDURE — 95816 EEG AWAKE AND DROWSY: CPT | Performed by: PSYCHIATRY & NEUROLOGY

## 2022-03-22 PROCEDURE — 6360000002 HC RX W HCPCS

## 2022-03-22 PROCEDURE — 80051 ELECTROLYTE PANEL: CPT

## 2022-03-22 PROCEDURE — 6370000000 HC RX 637 (ALT 250 FOR IP)

## 2022-03-22 PROCEDURE — 2580000003 HC RX 258: Performed by: INTERNAL MEDICINE

## 2022-03-22 PROCEDURE — 6360000002 HC RX W HCPCS: Performed by: INTERNAL MEDICINE

## 2022-03-22 PROCEDURE — 97165 OT EVAL LOW COMPLEX 30 MIN: CPT

## 2022-03-22 PROCEDURE — 6370000000 HC RX 637 (ALT 250 FOR IP): Performed by: STUDENT IN AN ORGANIZED HEALTH CARE EDUCATION/TRAINING PROGRAM

## 2022-03-22 PROCEDURE — 93010 ELECTROCARDIOGRAM REPORT: CPT | Performed by: INTERNAL MEDICINE

## 2022-03-22 RX ORDER — CHLORDIAZEPOXIDE HYDROCHLORIDE 5 MG/1
5 CAPSULE, GELATIN COATED ORAL 3 TIMES DAILY
Status: DISCONTINUED | OUTPATIENT
Start: 2022-03-22 | End: 2022-03-22 | Stop reason: HOSPADM

## 2022-03-22 RX ORDER — LEVETIRACETAM 500 MG/1
500 TABLET ORAL 2 TIMES DAILY
Qty: 60 TABLET | Refills: 0 | Status: SHIPPED | OUTPATIENT
Start: 2022-03-22

## 2022-03-22 RX ORDER — BLOOD PRESSURE TEST KIT
1 KIT MISCELLANEOUS DAILY
Qty: 1 KIT | Refills: 0 | Status: SHIPPED | OUTPATIENT
Start: 2022-03-22

## 2022-03-22 RX ORDER — IBUPROFEN 400 MG/1
400 TABLET ORAL ONCE
Status: COMPLETED | OUTPATIENT
Start: 2022-03-22 | End: 2022-03-22

## 2022-03-22 RX ADMIN — LEVETIRACETAM 500 MG: 500 TABLET, FILM COATED ORAL at 08:02

## 2022-03-22 RX ADMIN — SODIUM CHLORIDE, PRESERVATIVE FREE 10 ML: 5 INJECTION INTRAVENOUS at 08:04

## 2022-03-22 RX ADMIN — THIAMINE HYDROCHLORIDE 100 MG: 100 INJECTION, SOLUTION INTRAMUSCULAR; INTRAVENOUS at 08:02

## 2022-03-22 RX ADMIN — CHLORDIAZEPOXIDE HYDROCHLORIDE 5 MG: 5 CAPSULE ORAL at 14:16

## 2022-03-22 RX ADMIN — FOLIC ACID 1 MG: 1 TABLET ORAL at 08:02

## 2022-03-22 RX ADMIN — ENOXAPARIN SODIUM 40 MG: 40 INJECTION SUBCUTANEOUS at 08:04

## 2022-03-22 RX ADMIN — ESCITALOPRAM OXALATE 10 MG: 10 TABLET ORAL at 08:02

## 2022-03-22 RX ADMIN — CHLORDIAZEPOXIDE HYDROCHLORIDE 5 MG: 5 CAPSULE ORAL at 08:02

## 2022-03-22 RX ADMIN — IBUPROFEN 400 MG: 400 TABLET, FILM COATED ORAL at 14:16

## 2022-03-22 ASSESSMENT — PAIN DESCRIPTION - PAIN TYPE
TYPE: ACUTE PAIN
TYPE: ACUTE PAIN

## 2022-03-22 ASSESSMENT — PAIN SCALES - GENERAL
PAINLEVEL_OUTOF10: 4
PAINLEVEL_OUTOF10: 4
PAINLEVEL_OUTOF10: 0
PAINLEVEL_OUTOF10: 4
PAINLEVEL_OUTOF10: 0

## 2022-03-22 ASSESSMENT — PAIN - FUNCTIONAL ASSESSMENT: PAIN_FUNCTIONAL_ASSESSMENT: ACTIVITIES ARE NOT PREVENTED

## 2022-03-22 ASSESSMENT — PAIN DESCRIPTION - ORIENTATION
ORIENTATION: LEFT;RIGHT
ORIENTATION: RIGHT;LEFT

## 2022-03-22 ASSESSMENT — PAIN DESCRIPTION - LOCATION
LOCATION: LEG
LOCATION: LEG

## 2022-03-22 ASSESSMENT — PAIN DESCRIPTION - PROGRESSION
CLINICAL_PROGRESSION: NOT CHANGED
CLINICAL_PROGRESSION: NOT CHANGED

## 2022-03-22 ASSESSMENT — PAIN DESCRIPTION - FREQUENCY
FREQUENCY: CONTINUOUS
FREQUENCY: CONTINUOUS

## 2022-03-22 ASSESSMENT — PAIN DESCRIPTION - DESCRIPTORS
DESCRIPTORS: ACHING;SORE
DESCRIPTORS: SORE;ACHING

## 2022-03-22 ASSESSMENT — PAIN DESCRIPTION - ONSET: ONSET: ON-GOING

## 2022-03-22 NOTE — DISCHARGE SUMMARY
[address]    Discharge Summary     Patient ID: Godwin Kasper  :  1995   MRN: [id number]     ACCOUNT:  [de-identified]   Patient's PCP: No primary care provider on file. Admit Date: 3/20/2022   Discharge Date: 3/22/2022    Length of Stay: 2  Code Status:  Full Code  Admitting Physician: Caty Chaudhry MD  Discharge Physician: Gardenia Roy DO     Active Discharge Diagnoses:       Primary Problem  Generalized seizure [facility])      [place] Problems    Diagnosis Date Noted    Generalized seizure ([address] [R56.9] 2022    Anxiety [F41.9] 2022    Overweight (BMI 25.0-29. 9) [E66.3] 2022    Seizure ([address] [R56.9] 2022       Admission Condition:  fair     Discharged Condition: good    Hospital Stay:       Hospital Course:  Godwin Kasper is a 31 y.o. 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 Seizure happens in setting of night of drinking the day prior and recent increased stressors/anxiety/poor sleep. CT head negative. Patient seen by Neurologist and started on [address]. No further seizures. Current thought is seizures could be related to alcohol or possible underlying seizure disorder. EEG done and results pending. Pt also found to have elevated LFTs and with AST:ALT ratio >2 and findings of fatty liver on CT;  hepatitis most likely 2/2 alcoholic and fatty liver disease;  Pt advised to avoid alcohol and hepatotoxic agents. Today, on day of discharge, pt was seen and examined and he is medically stable for discharge with OP follow up for EEG results.      Pt has been hypertensive while here but reports history of high BP 2/2 anxiety while at doctor offices; Pt would prefer to hold off starting BP medications until seen by PCP; Will dc with BP monitoring kit and instructions given for maintenance of BP log at home.        Significant therapeutic interventions: Keppra    Significant Diagnostic Studies:   Labs / Micro:  CBC:   Lab Results   Component Value Date    WBC 7.3 03/21/2022    RBC 4.72 03/21/2022    HGB 15.5 03/21/2022    HCT 46.1 03/21/2022    MCV 97.6 03/21/2022    MCH 32.7 03/21/2022    MCHC 33.5 03/21/2022    RDW 14.9 03/21/2022     03/21/2022     BMP:    Lab Results   Component Value Date    GLUCOSE 94 03/21/2022     03/22/2022    K 3.7 03/22/2022     03/22/2022    CO2 23 03/22/2022    ANIONGAP 13 03/22/2022    BUN 9 03/21/2022    CREATININE 0.89 03/21/2022    CALCIUM 9.3 03/21/2022    LABGLOM >60 03/21/2022    GFRAA >60 03/21/2022    GFR      03/21/2022     HFP:    Lab Results   Component Value Date    PROT 7.6 03/20/2022     CMP:    Lab Results   Component Value Date    GLUCOSE 94 03/21/2022     03/22/2022    K 3.7 03/22/2022     03/22/2022    CO2 23 03/22/2022    BUN 9 03/21/2022    CREATININE 0.89 03/21/2022    ANIONGAP 13 03/22/2022    ALKPHOS 73 03/20/2022    ALT 87 03/20/2022     03/20/2022    BILITOT 1.13 03/20/2022    LABALBU 5.1 03/20/2022    LABGLOM >60 03/21/2022    GFRAA >60 03/21/2022    GFR      03/21/2022    PROT 7.6 03/20/2022    CALCIUM 9.3 03/21/2022     PT/INR:  No results found for: PTINR, PROTIME, INR  PTT: No results found for: APTT  FLP:  No results found for: CHOL, TRIG, HDL  U/A:    Lab Results   Component Value Date    COLORU Dark Yellow 03/20/2022    TURBIDITY Clear 03/20/2022    SPECGRAV 1.090 03/20/2022    HGBUR TRACE 03/20/2022    PHUR 7.5 03/20/2022    PROTEINU NEGATIVE  Verified by sulfosalicylic acid test. 24/39/2727    GLUCOSEU NEGATIVE 03/20/2022    KETUA SMALL 03/20/2022    BILIRUBINUR NEGATIVE 03/20/2022    UROBILINOGEN Normal 03/20/2022    NITRU NEGATIVE 03/20/2022    LEUKOCYTESUR NEGATIVE 03/20/2022     TSH:    Lab Results   Component Value Date    TSH 2.02 03/20/2022         Radiology:    CT HEAD WO CONTRAST    Result Date: 3/20/2022  EXAMINATION: CT OF THE HEAD WITHOUT CONTRAST  3/20/2022 1:23 pm TECHNIQUE: CT of the head was performed without the administration of intravenous contrast. Dose modulation, iterative reconstruction, and/or weight based adjustment of the mA/kV was utilized to reduce the radiation dose to as low as reasonably achievable. COMPARISON: None. HISTORY: ORDERING SYSTEM PROVIDED HISTORY: Seizure x2 TECHNOLOGIST PROVIDED HISTORY: Seizure x2 Decision Support Exception - unselect if not a suspected or confirmed emergency medical condition->Emergency Medical Condition (MA) Reason for Exam: new onset seizure activity 76-year-old male with seizure x 2 FINDINGS: BRAIN/VENTRICLES: The foramen magnum and 4th ventricles appear patent. The ventricles are normal in size and midline in position. The sulci, cisterns, and extra-axial spaces are grossly unremarkable in appearance. No abnormal extra-axial fluid collections are identified. There is no clear evidence for acute intracranial hemorrhage, mass, mass effect, or midline shift. There is gross preservation of the gray-white matter differentiation. The bilateral basal ganglia, thalami, and insular ribbons are relatively well-defined. ORBITS: The visualized portion of the orbits demonstrate no acute abnormality. SINUSES: The visualized paranasal sinuses and mastoid air cells demonstrate no acute abnormality. SOFT TISSUES/SKULL:  No acute abnormality of the visualized skull or soft tissues. No acute intracranial abnormality evident by CT.      CT ABDOMEN PELVIS W IV CONTRAST Additional Contrast? None    Result Date: 3/20/2022  EXAMINATION: CT OF THE ABDOMEN AND PELVIS WITH CONTRAST 3/20/2022 1:24 pm TECHNIQUE: CT of the abdomen and pelvis was performed with the administration of intravenous contrast. Multiplanar reformatted images are provided for review. Dose modulation, iterative reconstruction, and/or weight based adjustment of the mA/kV was utilized to reduce the radiation dose to as low as reasonably achievable. COMPARISON: None. HISTORY: ORDERING SYSTEM PROVIDED HISTORY: Elevated LFT, lipase, seizure x2 today TECHNOLOGIST PROVIDED HISTORY: Elevated LFT, lipase, seizure x2 today Decision Support Exception - unselect if not a suspected or confirmed emergency medical condition->Emergency Medical Condition (MA) Reason for Exam: Elevated LFT, lipase, seizure [id number] 69-year-old male with elevated LFTs, lipase, seizure x 2 FINDINGS: Lower Chest: Mild dependent atelectasis and respiratory motion. No free intra-abdominal air. Organs: Gallbladder, pancreas, adrenal glands, spleen, and kidneys grossly unremarkable in appearance. No obstructing calculus, hydronephrosis or hydroureter. Fatty liver. GI/Bowel: No significant dilation of small bowel loops to suggest small bowel obstruction. Normal appendix. Mild stool burden. Mild-to-moderate colonic diverticulosis. Submucosal fat attenuation throughout the visualized colonic segments likely related to sequela of a colitis or inflammatory bowel disease. Pelvis: Pelvic phleboliths. Mild distention of the urinary bladder. No inguinal or pelvic sidewall lymphadenopathy. Peritoneum/Retroperitoneum: Abdominal aorta normal in appearance and caliber. No retroperitoneal lymphadenopathy. Psoas muscles normal in size and symmetric in appearance. Bones/Soft Tissues: Small midline fat-containing periumbilical hernia. No acute osseous abnormality. Bilateral pars defects at L5-S1 with minimal 1 mm anterolisthesis. 1. Mild to moderate colonic diverticulosis. Normal appendix. Probable sequela of remote colitis or inflammatory bowel disease. 2. Fatty liver.  3. Small midline fat-containing periumbilical hernia. 4. No CT evidence for acute pancreatitis. 5. Grade 1 spondylolisthesis measuring 1 mm of L5 on S1.     XR CHEST PORTABLE    Result Date: 3/20/2022  EXAMINATION: ONE XRAY VIEW OF THE CHEST 3/20/2022 12:45 pm COMPARISON: None HISTORY: ORDERING SYSTEM PROVIDED HISTORY: Seizure TECHNOLOGIST PROVIDED HISTORY: Seizure Reason for Exam: Seizure FINDINGS: Eventration or elevation of the right hemidiaphragm. Clear lungs. No definite findings of pneumothorax or pleural effusion. Normal mediastinal, hilar, and cardiac contours. No acute fracture. No acute cardiopulmonary process. MRI BRAIN WO CONTRAST    Result Date: 3/21/2022  EXAMINATION: MRI OF THE BRAIN WITHOUT CONTRAST  3/21/2022 12:12 pm TECHNIQUE: Multiplanar multisequence MRI of the brain was performed without the administration of intravenous contrast. COMPARISON: None. HISTORY: ORDERING SYSTEM PROVIDED HISTORY: Seizure TECHNOLOGIST PROVIDED HISTORY: Seizure What is the sedation requirement?->None Decision Support Exception - unselect if not a suspected or confirmed emergency medical condition->Emergency Medical Condition (MA) Reason for Exam: pt states had new onset seizure yesterday, no history of seizure prior Initial evaluation. FINDINGS: INTRACRANIAL STRUCTURES/VENTRICLES: There is no acute infarct. The hippocampal structures are symmetric. No abnormal T2 FLAIR signal within the medial temporal lobes. No mass effect or midline shift. No evidence of an acute intracranial hemorrhage. The ventricles and sulci are normal in size and configuration. The sellar/suprasellar regions appear unremarkable. The normal signal voids within the major intracranial vessels appear maintained. ORBITS: The visualized portion of the orbits demonstrate no acute abnormality. SINUSES: The visualized paranasal sinuses and mastoid air cells demonstrate no acute abnormality.  BONES/SOFT TISSUES: The bone marrow signal intensity appears normal. The soft tissues demonstrate no acute abnormality. No acute intracranial abnormality. No acute infarct. Consultations:    Consults:     Final Specialist Recommendations/Findings:   IP CONSULT TO NEUROLOGY  IP CONSULT TO INTERNAL MEDICINE      The patient was seen and examined on day of discharge and this discharge summary is in conjunction with any daily progress note from day of discharge. Discharge plan:       Disposition: Home    Physician Follow Up:     PCP    Lexi Bermudez MD  [facility] [address]  [phone]    In 1 week      Linda De Luna MD  [address] [place] [address], [address] [place] [address]  [phone]    Call  call for EEG report       Requiring Further Evaluation/Follow Up POST HOSPITALIZATION/Incidental Findings: possible seizure disorder, hepatitis     Diet: regular diet    Activity: As tolerated    Instructions to Patient:   No driving for at least 6 months of being seizure free. Follow up with your PCP within next 1-2 weeks. Establish care with a Neurologist to follow up on EEG results and seizures as well as seizure medication management. Take BP at home daily and keep a log; take log to your PCP. You may take ibuprofen to help with sore tongue; make sure to take ibuprofen with food and not take more than the recommended amount on the bottle. Avoid alcohol.     Discharge Medications:      Medication List      START taking these medications    Blood Pressure Kit  1 Units by Does not apply route daily     levETIRAcetam 500 MG tablet  Commonly known as: KEPPRA  Take 1 tablet by mouth 2 times daily        CONTINUE taking these medications    Lexapro 10 MG tablet  Generic drug: escitalopram           Where to Get Your Medications      These medications were sent to [facility]  [address], [address] [facility] [address]    Phone: [phone]   · levETIRAcetam 500 MG tablet     You can get these medications from any pharmacy    Bring a paper prescription for each of these medications  · Blood Pressure Kit         Electronically signed by   Darlis Goodell, DO  3/22/2022  1:54 PM      Thank you Dr. Michael Benson primary care provider on file. for the opportunity to be involved in this patient's care. Attending Physician Statement  I have reviewed and edited the discharge summary of  Maynor Almarazmagan AS NEEDED  ,   including pertinent history and exam findings. I have personally seen the patient and participated in discharge planning and evaluation . I have reviewed the key elements of all parts of the discharge summary . I agree with the information and plans as documented by the resident. Time spent on discharge planning ;          [] less than 30 minutes . [x]   more  than 30 minutes . 3 minutes    Electronically signed by Dipti Mc MD

## 2022-03-22 NOTE — PROCEDURES
EEG REPORT       Patient: Cheryl Ely Age: 31 y.o. MRN: [id number]    Date: 3/20/2022  Referring Provider: No ref. provider found    History: This routine 30 minute scalp EEG was recorded with video- monitoring for a 31 y.o.. male  who presented with encephalopathy. This EEG was performed to evaluate for focal and epileptiform abnormalities.      Maynor Walsh   Current Facility-Administered Medications   Medication Dose Route Frequency Provider Last Rate Last Admin    chlordiazePOXIDE (LIBRIUM) capsule 5 mg  5 mg Oral TID Ruthie Pfeiffer MD   5 mg at 20/62/77 0961    folic acid (FOLVITE) tablet 1 mg  1 mg Oral Daily Amina Bass DO   1 mg at 03/22/22 0802    thiamine (B-1) injection 100 mg  100 mg IntraMUSCular Daily Amina Bass DO   100 mg at 03/22/22 0802    escitalopram (LEXAPRO) tablet 10 mg  10 mg Oral Daily Amina Bass DO   10 mg at 03/22/22 0802    0.9 % sodium chloride bolus  80 mL IntraVENous Once Anabell De Anda MD        sodium chloride flush 0.9 % injection 10 mL  10 mL IntraVENous PRN Anabell De Anda MD   10 mL at 03/20/22 1335    sodium chloride flush 0.9 % injection 5-40 mL  5-40 mL IntraVENous 2 times per day Anabell De Anda MD   10 mL at 03/22/22 0804    sodium chloride flush 0.9 % injection 5-40 mL  5-40 mL IntraVENous PRN Anabell De Anda MD        0.9 % sodium chloride infusion  25 mL IntraVENous PRN Anabell De Anda MD        enoxaparin (LOVENOX) injection 40 mg  40 mg SubCUTAneous Daily Anabell De Anda MD   40 mg at 03/22/22 0804    ondansetron (ZOFRAN-ODT) disintegrating tablet 4 mg  4 mg Oral Q8H PRN Anabell De Anda MD        Or    ondansetron [facility]) injection 4 mg  4 mg IntraVENous Q6H PRN Anabell De Anda MD        polyethylene glycol (GLYCOLAX) packet 17 g  17 g Oral Daily PRN Anabell De Anda MD        acetaminophen (TYLENOL) tablet 650 mg  650 mg Oral Q6H PRN Anabell De Anda MD        Or    acetaminophen (TYLENOL) suppository 650 mg  650 mg Rectal Q6H PRN Donnetta Ahumada, MD        0.9 % sodium chloride infusion   IntraVENous Continuous Donnetta Ahumada, MD 75 mL/hr at 03/20/22 2220 New Bag at 03/20/22 2220    levETIRAcetam (KEPPRA) tablet 500 mg  500 mg Oral BID Donnetta Ahumada, MD   500 mg at 03/22/22 0802    potassium chloride (KLOR-CON M) extended release tablet 40 mEq  40 mEq Oral PRN Ruy Egan MD        Or   Larios potassium bicarb-citric acid (EFFER-K) effervescent tablet 40 mEq  40 mEq Oral PRN Ruy Egan MD        Or   Larios potassium chloride 10 mEq/100 mL IVPB (Peripheral Line)  10 mEq IntraVENous PRN Ruy Egan MD            Technical Description: This is a 21 channel digital EEG recording with time-locked video. Electrodes were placed in accordance with the 10-20 International System of Electrode Placement. Single lead EKG monitoring as well as temporal electrodes were included. The patient was not sleep deprived. This recording was obtained during wakefulness. EEG Description: The dominant background activity during maximal recorded wakefulness consisted of bioccipitally dominant 9-10 Hz, 25-35 uV symmetric, regular activity that was reactive to eye opening. During drowsiness, the background rhythm waxed and waned and there were periods of slowing. Deeper sleep was not seen. Photic stimulation - stepwise photic stimulation at 2-30 Hz was performed and there was a biposterior, symmetric, driving response. Hyperventilation - was not preformed. No abnormalities were activated by photic stimulation     The EKG channel demonstrated a normal sinus rhythm. Interpretation  This EEG was normal in wakefulness and drowsiness. Clinical correlation  This EEG was normal. No focal or epileptiform abnormalities were seen.     Aby Moreno MD  Diplomate, American Board of Psychiatry and Neurology  Diplomate, American Board of Clinical Neurophysiology  Diplomate, American Board of Epilepsy
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
## 2022-03-22 NOTE — PROGRESS NOTES
Patient resting in bed, denies any pain or discomfort. No s/s of seizure activity, seizure precautions in place and discussed with patient. Fall precautions maintained, call light within reach, will continue to monitor. Awaiting results for EEG done yesterday.

## 2022-03-22 NOTE — PROGRESS NOTES
3/22/2022    Neurology note    Patient not in his room and no one on the floor can tell me where he is. EEG:  Interpretation  This EEG was normal in wakefulness and drowsiness.      Clinical correlation  This EEG was normal. No focal or epileptiform abnormalities were seen.     MARSHALL Grijalva MD  Diplomate, American Board of Psychiatry and Neurology  Diplomate, American Board of Clinical Neurophysiology  Diplomate, American Board of Epilepsy     Impression  1. Possible seizure like episode. This may have bee secondary to low glucose level. 2. Patient declined anti convulsants. Rec  1. Patient should follow up in Neurology clinic.     Maria M Isbell MD  Neurology

## 2022-03-22 NOTE — DISCHARGE INSTR - COC
Continuity of Care Form    Patient Name: Kathy King   :  1995  MRN:  [id number]    Admit date:  3/20/2022  Discharge date:  ***    Code Status Order: Full Code   Advance Directives:      Admitting Physician:  Alley Montenegro MD  PCP: No primary care provider on file. Discharging Nurse: [facility] Unit/Room#: 2759/0909-84  Discharging Unit Phone Number: ***    Emergency Contact:   Extended Emergency Contact Information  Primary Emergency Contact: [address] [place] [address], [address] [place] [address]. Phone: [phone]  Relation: Parent  Secondary Emergency Contact: Sanjay Owne Phone: [phone]  Relation: Parent    Past Surgical History:  Past Surgical History:   Procedure Laterality Date    FRACTURE SURGERY      left humerus ORIF       Immunization History: There is no immunization history on file for this patient. Active Problems:  Patient Active Problem List   Diagnosis Code    Generalized seizure ([address] R56.9    Anxiety F41.9    Overweight (BMI 25.0-29. 9) E66.3    Seizure ([address] R56.9       Isolation/Infection:   Isolation            No Isolation          Patient Infection Status       None to display            Nurse Assessment:  Last Vital Signs: BP (!) 132/101   Pulse 68   Temp 98.2 °F (36.8 °C)   Resp 19   Ht 5' 7\" (1.702 m)   Wt 175 lb (79.4 kg)   SpO2 97%   BMI 27.41 kg/m²     Last documented pain score (0-10 scale): Pain Level: 4  Last Weight:   Wt Readings from Last 1 Encounters:   22 175 lb (79.4 kg)     Mental Status:  {IP PT MENTAL STATUS:}    IV Access:  { JOHANNY IV ACCESS:180001311}    Nursing Mobility/ADLs:  Walking   {CHP DME GPDF:939802260}  Transfer  {CHP DME KRXJ:147405556}  Bathing  {CHP DME IDHP:146921128}  Dressing  {CHP DME SSRM:785751202}  Toileting  {CHP DME XXOP:728744997}  Feeding  {CHP DME QOBO:775253937}  Med Admin  {P DME RSYY:303087361}  Med Delivery   { JOHANNY MED Delivery:042960657}    Wound Care Documentation and Therapy: Elimination:  Continence: Bowel: {YES / XT:99578}  Bladder: {YES / KO:63449}  Urinary Catheter: {Urinary Catheter:097232143}   Colostomy/Ileostomy/Ileal Conduit: {YES / WH:01873}       Date of Last BM: ***  No intake or output data in the 24 hours ending 22 0947  I/O last 3 completed shifts:   In: 100 [P.O.:100]  Out: 200 [Urine:200]    Safety Concerns:     [address] [employer] Safety Concerns:921719955}    Impairments/Disabilities:      [facility] Impairments/Disabilities:343741106}    Nutrition Therapy:  Current Nutrition Therapy:   [facility] Diet List:189931247}    Routes of Feeding: {CHP DME Other Feedings:127265341}  Liquids: {Slp liquid thickness:51277}  Daily Fluid Restriction: {CHP DME Yes amt example:301062212}  Last Modified Barium Swallow with Video (Video Swallowing Test): {Done Not Done MSEI:269071866}    Treatments at the Time of Hospital Discharge:   Respiratory Treatments: ***  Oxygen Therapy:  {Therapy; copd oxygen:00552}  Ventilator:    { CC Vent XQJJ:254955507}    Rehab Therapies: {THERAPEUTIC INTERVENTION:2108556768}  Weight Bearing Status/Restrictions: [facility] Weight Bearin}  Other Medical Equipment (for information only, NOT a DME order):  {EQUIPMENT:095945942}  Other Treatments: ***    Patient's personal belongings (please select all that are sent with patient):  {[facility] DME Belongings:948909914}    RN SIGNATURE:  {Esignature:896065075}    CASE MANAGEMENT/SOCIAL WORK SECTION    Inpatient Status Date: ***    Readmission Risk Assessment Score:  Readmission Risk              Risk of Unplanned Readmission:  8           Discharging to Facility/ Agency   Name:   Address:  Phone:  Fax:    Dialysis Facility (if applicable)   Name:  Address:  Dialysis Schedule:  Phone:  Fax:    / signature: {Esignature:326054292}    PHYSICIAN SECTION    Prognosis: {Prognosis:8263205044}    Condition at Discharge: [address] [facility] Patient Condition:377879816}    Rehab Potential (if transferring to Rehab): {Prognosis:3566708991}    Recommended Labs or Other Treatments After Discharge: ***    Physician Certification: I certify the above information and transfer of Agustina Spicer  is necessary for the continuing treatment of the diagnosis listed and that he requires {Admit to Appropriate Level of Care:38889} for {GREATER/LESS:743891110} 30 days.      Update Admission H&P: {CHP DME Changes in IKUB:308617060}    PHYSICIAN SIGNATURE:  {Esignature:964892211}

## 2022-03-22 NOTE — PLAN OF CARE
Problem: Infection:  Goal: Will remain free from infection  Description: Will remain free from infection  3/22/2022 0043 by Ramez Fischer RN  Outcome: Ongoing  Note: Patient afebrile, will continue to monitor labs and vitals,  3/21/2022 1559 by Lukasz Astorga RN  Outcome: Ongoing     Problem: Safety:  Goal: Free from accidental physical injury  Description: Free from accidental physical injury  3/22/2022 0043 by Ramez Fischer RN  Outcome: Ongoing  Note: Fall precautions in place, patient free from injuries.  Patient educated about safety precautions, will continue to monitor    3/21/2022 1559 by Lukasz Astorga RN  Outcome: Ongoing  Goal: Free from intentional harm  Description: Free from intentional harm  3/22/2022 0043 by Ramez Fischer RN  Outcome: Ongoing  3/21/2022 1559 by Lukasz Astorga RN  Outcome: Ongoing  Goal: Ability to remain free from injury will improve  Description: Ability to remain free from injury will improve  3/21/2022 1559 by Lukasz Astorga RN  Outcome: Ongoing     Problem: Daily Care:  Goal: Daily care needs are met  Description: Daily care needs are met  3/22/2022 0043 by Ramez Fischer RN  Outcome: Ongoing  3/21/2022 1559 by Lukasz Astorga RN  Outcome: Ongoing     Problem: Pain:  Goal: Patient's pain/discomfort is manageable  Description: Patient's pain/discomfort is manageable  3/22/2022 0043 by Ramez Fischer RN  Outcome: Ongoing  Note: Patient denies any pain, will continue to monitor   3/21/2022 1559 by Lukasz Astorga RN  Outcome: Ongoing     Problem: Skin Integrity:  Goal: Skin integrity will stabilize  Description: Skin integrity will stabilize  3/22/2022 0043 by Ramez Fischer RN  Outcome: Ongoing  Note: Skin tear to tongue, is scabbed over, no s/s of infection, will monitor   3/21/2022 1559 by Lukasz Astorga RN  Outcome: Ongoing     Problem: Discharge Planning:  Goal: Patients continuum of care needs are met  Description: Patients continuum of care needs are met  3/22/2022 0043 by Scotty Armendariz RN  Outcome: Ongoing  3/21/2022 1559 by Sheryl Cano RN  Outcome: Ongoing     Problem: Anxiety:  Goal: Level of anxiety will decrease  Description: Level of anxiety will decrease  3/22/2022 0043 by Scotty Armendariz RN  Outcome: Ongoing  Note: Patient denies any anxiety  3/21/2022 1559 by Sheryl Cano RN  Outcome: Ongoing     Problem: Coping:  Goal: Ability to cope will improve  Description: Ability to cope will improve  3/22/2022 0043 by Scotty Armendariz RN  Outcome: Ongoing  3/21/2022 1559 by Sheryl Cano RN  Outcome: Ongoing  Goal: Ability to identify appropriate support needs will improve  Description: Ability to identify appropriate support needs will improve  3/22/2022 0043 by Scotty Armendariz RN  Outcome: Ongoing  Note: Discussed family support team with patient   3/21/2022 1559 by Sheryl Cano RN  Outcome: Ongoing     Problem: Health Behavior:  Goal: Ability to manage health-related needs will improve  Description: Ability to manage health-related needs will improve  3/21/2022 1559 by Sheryl Cano RN  Outcome: Ongoing     Problem: Physical Regulation:  Goal: Signs of adequate cerebral perfusion will increase  Description: Signs of adequate cerebral perfusion will increase  3/21/2022 1559 by Sheryl Cano RN  Outcome: Ongoing  Goal: Ability to maintain a stable neurologic state will improve  Description: Ability to maintain a stable neurologic state will improve  3/21/2022 1559 by Sheryl Cano RN  Outcome: Ongoing     Problem: Self-Concept:  Goal: Level of anxiety will decrease  Description: Level of anxiety will decrease  3/22/2022 0043 by Scotty Armendariz RN  Outcome: Ongoing  Note: Patient denies any anxiety  3/21/2022 1559 by Sheryl Cano RN  Outcome: Ongoing  Goal: Ability to verbalize feelings about condition will improve  Description: Ability to verbalize feelings about condition will improve  3/21/2022 1559 by Miat Tello Amor Salguero RN  Outcome: Ongoing

## 2022-03-22 NOTE — PROGRESS NOTES
CLINICAL PHARMACY NOTE: MEDS TO BEDS    Total # of Prescriptions Filled: 1   The following medications were delivered to the patient:  · levetiracetam    Additional Documentation:  Co-pay collected
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
## 2022-03-23 ENCOUNTER — CARE COORDINATION (OUTPATIENT)
Dept: CASE MANAGEMENT | Age: 27
End: 2022-03-23

## 2022-03-23 NOTE — CARE COORDINATION
[facility] Transitions Initial Follow Up Call    Call within 2 business days of discharge: Yes    Patient: Enedina Thomas Patient : 1995   MRN: <[id number]>  Reason for Admission:   Generalized seizure   Discharge Date: 3/22/22 RARS: Readmission Risk Score: 5.1 ( )      Last Discharge [facility]       Complaint Diagnosis Description Type Department Provider    3/20/22 Seizures Seizure ([facility] [address] . .. ED to Hosp-Admission (Discharged) (ADMITTED) Nevaeh Larose MD; Dalton Hu. .. Spoke with:  Attempted to contact patient for 24 hour initial transitional call. Unable to reach patient. No voicemail. Unable to leave message. Facility: [address] [place] [address]    Non-face-to-face services provided:  Obtained and reviewed discharge summary and/or continuity of care documents    Care Transitions 24 Hour Call    Care Transitions Interventions         Follow Up  No future appointments.     QUIANA Livingston LPN Care Transitions Nurse   [phone]

## 2022-03-24 ENCOUNTER — CARE COORDINATION (OUTPATIENT)
Dept: CASE MANAGEMENT | Age: 27
End: 2022-03-24

## 2022-03-24 NOTE — CARE COORDINATION
[facility] Transitions Initial Follow Up Call    Call within 2 business days of discharge: Yes    Patient: Selena Pizarro Patient : 1995   MRN: <[id number]>  Reason for Admission: seizure  Discharge Date: 3/22/22 RARS: Readmission Risk Score: 5.1 ( )      Last Discharge [facility]       Complaint Diagnosis Description Type Department Provider    3/20/22 Seizures Seizure ([facility] [address] . .. ED to Hosp-Admission (Discharged) (ADMITTED) Jg Kaur MD; Gustavo Cota. .. Spoke with: Transitions of Care Initial Call: spoke to Nelda Madison the role of Care Transition Nurse and the Transition program, patient is agreeable to follow up calls Post discharge from the [facility] Mally states he is doing \"very well\" Denies pain. Denies ongoing seizures. Pt states he is avoiding stressors and alcohol since hospitalized. Sleeping well. Medication reconciliation completed. Non [facility]CP. Pt has an appt with Mirna Weaver PCP on 22. Appetite and fluid intake is good. Bowel and bladder WNL. Pt has not had the Covid Vaccine. No new issues or concerns. No HHC or DME needs at this time. Final call. Patient is aware of when to contact MD with any new or worsening symptoms. Advised to contact PCP  with any health concerns for early outpatient intervention in an effort to avoid hospitalization. Report any worsening symptoms to PCP and/or Call 911 and/or GO TO EMERGENCY ROOM if symptoms are severe. Expresses understanding. Transitions of Care Initial Call    Was this an external facility discharge? No Discharge Facility: n/a    Challenges to be reviewed by the provider   Additional needs identified to be addressed with provider: No  none             Method of communication with provider : none      Advance Care Planning:   Does patient have an Advance Directive: reviewed and current. Was this a readmission?  No  Patient stated reason for admission: seizure  Patients top risk factors for readmission: medical condition-seizure    Care Transition Nurse (CTN) contacted the patient by telephone to perform post hospital discharge assessment. Verified name and  with patient as identifiers. Provided introduction to self, and explanation of the CTN role. CTN reviewed discharge instructions, medical action plan and red flags with patient who verbalized understanding. Patient given an opportunity to ask questions and does not have any further questions or concerns at this time. Were discharge instructions available to patient? Yes. Reviewed appropriate site of care based on symptoms and resources available to patient including: PCP  When to call [address] The patient agrees to contact the PCP office for questions related to their healthcare. Medication reconciliation was performed with patient, who verbalizes understanding of administration of home medications. Advised obtaining a 90-day supply of all daily and as-needed medications. Covid Risk Education     Educated patient about risk for severe COVID-19 due to risk factors according to CDC guidelines. LPN CC reviewed discharge instructions, medical action plan and red flag symptoms with the patient who verbalized understanding. Discussed COVID vaccination status: Yes. Education provided on COVID-19 vaccination as appropriate. Discussed exposure protocols and quarantine with CDC Guidelines. Patient was given an opportunity to verbalize any questions and concerns and agrees to contact LPN CC or health care provider for questions related to their healthcare. Reviewed and educated patient on any new and changed medications related to discharge diagnosis. Was patient discharged with a pulse oximeter? No Discussed and confirmed pulse oximeter discharge instructions and when to notify provider or seek emergency care. LPN CC provided contact information.  No further follow-up call indicated based on severity of symptoms and risk factors. Plan for next call: n/a        Facility: [address] [place] [address]    Non-face-to-face services provided:  Obtained and reviewed discharge summary and/or continuity of care documents    Care Transitions 24 Hour Call    Care Transitions Interventions         Follow Up  No future appointments.     QUIANA Leach LPN Care Transitions Nurse   [phone]

## 2022-05-09 ENCOUNTER — TELEPHONE (OUTPATIENT)
Dept: NEUROLOGY | Age: 27
End: 2022-05-09

## 2022-05-09 DIAGNOSIS — R56.9 UNSPECIFIED CONVULSIONS (CMD): Primary | ICD-10-CM
